# Patient Record
Sex: FEMALE | Race: WHITE
[De-identification: names, ages, dates, MRNs, and addresses within clinical notes are randomized per-mention and may not be internally consistent; named-entity substitution may affect disease eponyms.]

---
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
## 2020-03-03 ENCOUNTER — TRANSCRIPTION ENCOUNTER (OUTPATIENT)
Age: 20
End: 2020-03-03

## 2020-10-14 ENCOUNTER — APPOINTMENT (OUTPATIENT)
Dept: OBGYN | Facility: CLINIC | Age: 20
End: 2020-10-14
Payer: COMMERCIAL

## 2020-10-14 VITALS — BODY MASS INDEX: 27.6 KG/M2 | HEIGHT: 62 IN | WEIGHT: 150 LBS

## 2020-10-14 DIAGNOSIS — R19.09 OTHER INTRA-ABDOMINAL AND PELVIC SWELLING, MASS AND LUMP: ICD-10-CM

## 2020-10-14 PROCEDURE — 99213 OFFICE O/P EST LOW 20 MIN: CPT

## 2021-06-18 ENCOUNTER — TRANSCRIPTION ENCOUNTER (OUTPATIENT)
Age: 21
End: 2021-06-18

## 2021-07-27 ENCOUNTER — APPOINTMENT (OUTPATIENT)
Dept: OBGYN | Facility: CLINIC | Age: 21
End: 2021-07-27
Payer: COMMERCIAL

## 2021-07-27 VITALS
HEIGHT: 62 IN | SYSTOLIC BLOOD PRESSURE: 111 MMHG | WEIGHT: 130 LBS | BODY MASS INDEX: 23.92 KG/M2 | DIASTOLIC BLOOD PRESSURE: 70 MMHG

## 2021-07-27 DIAGNOSIS — Z01.419 ENCOUNTER FOR GYNECOLOGICAL EXAMINATION (GENERAL) (ROUTINE) W/OUT ABNORMAL FINDINGS: ICD-10-CM

## 2021-07-27 DIAGNOSIS — Z30.011 ENCOUNTER FOR INITIAL PRESCRIPTION OF CONTRACEPTIVE PILLS: ICD-10-CM

## 2021-07-27 PROCEDURE — 99072 ADDL SUPL MATRL&STAF TM PHE: CPT

## 2021-07-27 PROCEDURE — 99395 PREV VISIT EST AGE 18-39: CPT

## 2021-07-27 RX ORDER — NORGESTIMATE AND ETHINYL ESTRADIOL 0.25-0.035
0.25-35 KIT ORAL
Qty: 84 | Refills: 3 | Status: COMPLETED | COMMUNITY
Start: 2018-11-29 | End: 2021-07-27

## 2021-07-27 RX ORDER — ALBUTEROL SULFATE 90 UG/1
108 (90 BASE) POWDER, METERED RESPIRATORY (INHALATION)
Qty: 1 | Refills: 0 | Status: COMPLETED | COMMUNITY
Start: 2019-01-02 | End: 2021-07-27

## 2021-07-27 RX ORDER — NORETHINDRONE ACETATE/ETHINYL ESTRADIOL 1MG-20MCG
1-20 KIT ORAL
Qty: 84 | Refills: 3 | Status: ACTIVE | COMMUNITY
Start: 2019-01-04 | End: 1900-01-01

## 2021-07-27 RX ORDER — CEFDINIR 300 MG/1
300 CAPSULE ORAL
Qty: 20 | Refills: 0 | Status: COMPLETED | COMMUNITY
Start: 2019-03-05 | End: 2021-07-27

## 2021-10-15 ENCOUNTER — NON-APPOINTMENT (OUTPATIENT)
Age: 21
End: 2021-10-15

## 2021-11-12 ENCOUNTER — NON-APPOINTMENT (OUTPATIENT)
Age: 21
End: 2021-11-12

## 2021-11-12 RX ORDER — DROSPIRENONE AND ETHINYL ESTRADIOL 0.02-3(28)
3-0.02 KIT ORAL
Qty: 1 | Refills: 1 | Status: ACTIVE | COMMUNITY
Start: 2021-11-12 | End: 1900-01-01

## 2022-03-14 ENCOUNTER — NON-APPOINTMENT (OUTPATIENT)
Age: 22
End: 2022-03-14

## 2022-11-01 ENCOUNTER — NON-APPOINTMENT (OUTPATIENT)
Age: 22
End: 2022-11-01

## 2023-05-24 ENCOUNTER — NON-APPOINTMENT (OUTPATIENT)
Age: 23
End: 2023-05-24

## 2023-05-24 ENCOUNTER — APPOINTMENT (OUTPATIENT)
Dept: ORTHOPEDIC SURGERY | Facility: CLINIC | Age: 23
End: 2023-05-24
Payer: COMMERCIAL

## 2023-05-24 VITALS — DIASTOLIC BLOOD PRESSURE: 62 MMHG | HEIGHT: 64 IN | SYSTOLIC BLOOD PRESSURE: 96 MMHG | HEART RATE: 60 BPM
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
## 2023-05-24 DIAGNOSIS — S83.211A BUCKET-HANDLE TEAR OF MEDIAL MENISCUS, CURRENT INJURY, RIGHT KNEE, INITIAL ENCOUNTER: ICD-10-CM

## 2023-05-24 PROCEDURE — 99204 OFFICE O/P NEW MOD 45 MIN: CPT

## 2023-05-24 PROCEDURE — 73080 X-RAY EXAM OF ELBOW: CPT | Mod: RT

## 2023-05-24 PROCEDURE — 73564 X-RAY EXAM KNEE 4 OR MORE: CPT | Mod: RT

## 2023-05-24 RX ORDER — NAPROXEN 500 MG/1
500 TABLET ORAL
Qty: 20 | Refills: 1 | Status: ACTIVE | COMMUNITY
Start: 2023-05-24 | End: 1900-01-01

## 2023-05-24 NOTE — HISTORY OF PRESENT ILLNESS
[de-identified] : FAB MCCOLLUM  is a 22 year year old right-hand-dominant F who presents with right elbow and right knee pain.  She says that about 2 weeks ago she fell and slipped on some wet floor and thinks she landed on her right hand.  Since this injury she had right elbow pain.  He says that initially it was difficult for her to fully straighten her elbow.  This has improved such that she can fully straighten and fully bend the elbow, but she still has some residual pain on the medial aspect of the elbow.  She says she feels it with pronation supination mostly.  In terms of the right knee, she said that she was in the shower yesterday when she twisted on the right knee which caused her to fall.  After this she had pain and swelling in the right knee.  She says the swelling got worse overnight and currently she has difficulty bearing weight on the knee.  She says the pain is mostly on the posterior aspect of the knee

## 2023-05-24 NOTE — PHYSICAL EXAM
[de-identified] : General: No apparent distress\par Cardiovascular: extremities warm and well-perfused, no cyanosis\par Pulmonary: non labored respirations\par \par \par Left knee:\par Skin is intact, warm, and dry\par Motor and sensory intact, lower extremity warm and well-perfused\par \par Disuse atrophy: None\par Effusion: None\par \par Active ROM: 0 to 130 degrees of flexion\par Passive ROM: Same as active\par \par Joint Line tenderness: None\par Patellar facet tenderness: None\par \par Ligamentous Exam:\par Negative anterior drawer, negative Lachman, stable to varus and valgus stress at 0 and 30 degrees flexion\par \par Right Knee: \par Skin is intact, warm, and dry\par Motor and sensory intact, lower extremity warm and well-perfused\par \par Disuse atrophy: None\par Effusion: Mild\par \par Active ROM: 5 to 110 degrees of flexion by pain\par Passive ROM: Same as active\par \par Joint Line tenderness: Tender to palpation about posterior medial joint line\par Patellar facet tenderness: None\par \par Ligamentous Exam:\par Negative anterior drawer, negative Lachman, stable to varus and valgus stress at 0 and 30 degrees flexion\par \par \par Right Elbow: \par Skin:\par Motor: Fires FPL/EPL/ADINA/wrist extensors\par Sensory:  SILT median/ulnar/radial distributions\par Vascular: Fingers warm and well-perfused \par Tenderness: Negative lateral epicondyle, positive medial epicondyle\par Resisted wrist extension: Negative\par Resisted wrist flexion: Negative\par Pain with resisted pronation [de-identified] : 3 views of the right elbow including AP, lateral, oblique views obtained today and interpreted by me demonstrate no acute fracture or dislocation.  There is a small ossific fragment just lateral to the radiocapitellar joint\par \par 4 views of the right knee obtained today and interpreted by me including AP, lateral, flexion AP, sunrise views demonstrate no acute fracture or dislocation

## 2023-05-24 NOTE — DISCUSSION/SUMMARY
[de-identified] : Right elbow medial epicondylitis.  Discussed with her and her mother that she appears to have tendinitis of the right elbow which can occur after a traumatic fall on outstretched hand.  We discussed that this is improving and we will continue to recommend nonoperative treatment.\par \par Right knee medial meniscus tear.  Discussed with her that since she does have significant swelling of the knee as well as limited range of motion, particularly inability to fully straighten the knee, this is concerning for a medial meniscus tear, possibly a bucket-handle medial meniscus tear that is providing mechanical block to motion.  Discussed that because of this I would recommend an MRI to evaluate the meniscus and ligaments of the knee further.  If she does have a bucket-handle tear of the meniscus, I would likely recommend surgery in order to repair or remove the torn fragment rather than nonoperative treatment as this would not resolve her mechanical block to motion.  I will prescribe her naproxen 500 mg twice daily to be taken with food for the acute pain and swelling that she is having.  She will come back and see me once the MRI is complete.  The patient expressed understanding of the diagnosis and treatment plan and all questions were answered.\par \par This note was generated using dragon medical dictation software.  A reasonable effort has been made for proofreading its contents, but typos may still remain.  If there are any questions or points of clarification needed please notify my office.

## 2023-06-07 ENCOUNTER — TRANSCRIPTION ENCOUNTER (OUTPATIENT)
Age: 23
End: 2023-06-07

## 2023-06-09 ENCOUNTER — APPOINTMENT (OUTPATIENT)
Dept: MRI IMAGING | Facility: CLINIC | Age: 23
End: 2023-06-09
Payer: COMMERCIAL

## 2023-06-09 PROCEDURE — 73721 MRI JNT OF LWR EXTRE W/O DYE: CPT | Mod: RT

## 2023-06-30 ENCOUNTER — APPOINTMENT (OUTPATIENT)
Dept: ORTHOPEDIC SURGERY | Facility: CLINIC | Age: 23
End: 2023-06-30
Payer: COMMERCIAL

## 2023-06-30 PROCEDURE — 27530 TREAT KNEE FRACTURE: CPT | Mod: RT

## 2023-06-30 PROCEDURE — 99212 OFFICE O/P EST SF 10 MIN: CPT | Mod: 57

## 2023-06-30 NOTE — HISTORY OF PRESENT ILLNESS
[de-identified] : FAB MCCOLLUM is a 22 year old right-hand-dominant F who presents today for follow-up of the right knee and the right elbow. She currently has no issues or pain with the elbow. She is having pain at the back of her knee.  She reports that overall the knee is much better.  She says that she does get some pain in the back of the knee with full extension, but overall it is much improved.  She comes to review her MRI today.

## 2023-06-30 NOTE — PHYSICAL EXAM
[de-identified] : General: No apparent distress\par Cardiovascular: extremities warm and well-perfused, no cyanosis\par Pulmonary: non labored respirations\par \par \par Left knee:\par Skin is intact, warm, and dry\par Motor and sensory intact, lower extremity warm and well-perfused\par \par Disuse atrophy: None\par Effusion: None\par \par Active ROM: 0 to 130 degrees of flexion\par Passive ROM: Same as active\par \par Joint Line tenderness: None\par Patellar facet tenderness: None\par \par Ligamentous Exam:\par Negative anterior drawer, negative Lachman, stable to varus and valgus stress at 0 and 30 degrees flexion\par \par Right Knee: \par Skin is intact, warm, and dry\par Motor and sensory intact, lower extremity warm and well-perfused\par \par Disuse atrophy: None\par Effusion: None\par \par Active ROM: 0-130 degrees of flexion by pain\par Passive ROM: Same as active\par \par Joint Line tenderness: None\par Patellar facet tenderness: None\par \par Ligamentous Exam:\par Negative anterior drawer, negative Lachman, stable to varus and valgus stress at 0 and 30 degrees flexion\par

## 2023-06-30 NOTE — REASON FOR VISIT
[Follow-Up Visit] : a follow-up visit for [Knee Pain] : knee pain [Other: ____] : [unfilled] [Parent] : parent

## 2023-06-30 NOTE — DISCUSSION/SUMMARY
[de-identified] : Right knee nondisplaced tibial plateau fracture.  Discussed with her and her mom that the fracture is only seen on MRI and should be stable fracture.  Discussed that it should improve over time and can take 8 to 12 weeks total for it to completely heal.  In that time, she should be out of any high-impact activities such as running and weighted lower extremity exercises.  Discussed that her pain should also improve over the next few weeks.  If she continues to have any pain after 4 to 6 weeks, she may come back and see me for repeat evaluation.  The patient expressed understanding of the diagnosis and treatment plan and all questions were answered.\par \par This note was generated using dragon medical dictation software.  A reasonable effort has been made for proofreading its contents, but typos may still remain.  If there are any questions or points of clarification needed please notify my office.

## 2023-09-26 ENCOUNTER — APPOINTMENT (OUTPATIENT)
Dept: OBGYN | Facility: CLINIC | Age: 23
End: 2023-09-26

## 2023-11-08 ENCOUNTER — NON-APPOINTMENT (OUTPATIENT)
Age: 23
End: 2023-11-08

## 2023-12-05 ENCOUNTER — NON-APPOINTMENT (OUTPATIENT)
Age: 23
End: 2023-12-05

## 2024-02-26 ENCOUNTER — NON-APPOINTMENT (OUTPATIENT)
Age: 24
End: 2024-02-26

## 2024-03-27 ENCOUNTER — NON-APPOINTMENT (OUTPATIENT)
Age: 24
End: 2024-03-27

## 2025-01-31 ENCOUNTER — NON-APPOINTMENT (OUTPATIENT)
Age: 25
End: 2025-01-31

## 2025-05-11 ENCOUNTER — NON-APPOINTMENT (OUTPATIENT)
Age: 25
End: 2025-05-11